# Patient Record
Sex: FEMALE | Race: WHITE | NOT HISPANIC OR LATINO | Employment: UNEMPLOYED | URBAN - METROPOLITAN AREA
[De-identification: names, ages, dates, MRNs, and addresses within clinical notes are randomized per-mention and may not be internally consistent; named-entity substitution may affect disease eponyms.]

---

## 2023-03-10 ENCOUNTER — HOSPITAL ENCOUNTER (EMERGENCY)
Facility: HOSPITAL | Age: 33
Discharge: HOME | End: 2023-03-10
Attending: FAMILY MEDICINE
Payer: MEDICAID

## 2023-03-10 VITALS
HEIGHT: 67 IN | SYSTOLIC BLOOD PRESSURE: 176 MMHG | HEART RATE: 78 BPM | TEMPERATURE: 98 F | BODY MASS INDEX: 26.99 KG/M2 | DIASTOLIC BLOOD PRESSURE: 106 MMHG | RESPIRATION RATE: 16 BRPM | OXYGEN SATURATION: 99 % | WEIGHT: 171.94 LBS

## 2023-03-10 DIAGNOSIS — N18.6 CKD (CHRONIC KIDNEY DISEASE) REQUIRING CHRONIC DIALYSIS: Primary | ICD-10-CM

## 2023-03-10 DIAGNOSIS — Z99.2 CKD (CHRONIC KIDNEY DISEASE) REQUIRING CHRONIC DIALYSIS: Primary | ICD-10-CM

## 2023-03-10 DIAGNOSIS — T82.848A PAIN FROM A/V FISTULA: ICD-10-CM

## 2023-03-10 DIAGNOSIS — I82.C29 CHRONIC DEEP VEIN THROMBOSIS (DVT) OF INTERNAL JUGULAR VEIN: ICD-10-CM

## 2023-03-10 LAB
ALBUMIN SERPL-MCNC: 3.5 G/DL (ref 3.5–5)
ALBUMIN/GLOB SERPL: 1 RATIO (ref 1.1–2)
ALP SERPL-CCNC: 98 UNIT/L (ref 40–150)
ALT SERPL-CCNC: 15 UNIT/L (ref 0–55)
AST SERPL-CCNC: 11 UNIT/L (ref 5–34)
B-HCG SERPL QL: NEGATIVE
BASOPHILS # BLD AUTO: 0.11 X10(3)/MCL (ref 0–0.2)
BASOPHILS NFR BLD AUTO: 1.1 %
BILIRUBIN DIRECT+TOT PNL SERPL-MCNC: 0.2 MG/DL
BUN SERPL-MCNC: 26.7 MG/DL (ref 7–18.7)
CALCIUM SERPL-MCNC: 9.5 MG/DL (ref 8.4–10.2)
CHLORIDE SERPL-SCNC: 107 MMOL/L (ref 98–107)
CO2 SERPL-SCNC: 24 MMOL/L (ref 22–29)
CREAT SERPL-MCNC: 2.77 MG/DL (ref 0.55–1.02)
EOSINOPHIL # BLD AUTO: 0.23 X10(3)/MCL (ref 0–0.9)
EOSINOPHIL NFR BLD AUTO: 2.4 %
ERYTHROCYTE [DISTWIDTH] IN BLOOD BY AUTOMATED COUNT: 12.9 % (ref 11.5–17)
GFR SERPLBLD CREATININE-BSD FMLA CKD-EPI: 22 MLS/MIN/1.73/M2
GLOBULIN SER-MCNC: 3.4 GM/DL (ref 2.4–3.5)
GLUCOSE SERPL-MCNC: 104 MG/DL (ref 74–100)
HCT VFR BLD AUTO: 29.5 % (ref 37–47)
HGB BLD-MCNC: 9.6 G/DL (ref 12–16)
IMM GRANULOCYTES # BLD AUTO: 0.03 X10(3)/MCL (ref 0–0.04)
IMM GRANULOCYTES NFR BLD AUTO: 0.3 %
LYMPHOCYTES # BLD AUTO: 1.28 X10(3)/MCL (ref 0.6–4.6)
LYMPHOCYTES NFR BLD AUTO: 13.1 %
MCH RBC QN AUTO: 29.1 PG
MCHC RBC AUTO-ENTMCNC: 32.5 G/DL (ref 33–36)
MCV RBC AUTO: 89.4 FL (ref 80–94)
MONOCYTES # BLD AUTO: 0.74 X10(3)/MCL (ref 0.1–1.3)
MONOCYTES NFR BLD AUTO: 7.6 %
NEUTROPHILS # BLD AUTO: 7.39 X10(3)/MCL (ref 2.1–9.2)
NEUTROPHILS NFR BLD AUTO: 75.5 %
NRBC BLD AUTO-RTO: 0 %
PLATELET # BLD AUTO: 410 X10(3)/MCL (ref 130–400)
PMV BLD AUTO: 9.3 FL (ref 7.4–10.4)
POTASSIUM SERPL-SCNC: 3.9 MMOL/L (ref 3.5–5.1)
PROT SERPL-MCNC: 6.9 GM/DL (ref 6.4–8.3)
RBC # BLD AUTO: 3.3 X10(6)/MCL (ref 4.2–5.4)
SODIUM SERPL-SCNC: 139 MMOL/L (ref 136–145)
WBC # SPEC AUTO: 9.8 X10(3)/MCL (ref 4.5–11.5)

## 2023-03-10 PROCEDURE — 99283 EMERGENCY DEPT VISIT LOW MDM: CPT | Mod: 25

## 2023-03-10 PROCEDURE — 84703 CHORIONIC GONADOTROPIN ASSAY: CPT | Performed by: FAMILY MEDICINE

## 2023-03-10 PROCEDURE — 85025 COMPLETE CBC W/AUTO DIFF WBC: CPT | Performed by: FAMILY MEDICINE

## 2023-03-10 PROCEDURE — 80053 COMPREHEN METABOLIC PANEL: CPT | Performed by: FAMILY MEDICINE

## 2023-03-10 RX ORDER — HYDROCODONE BITARTRATE AND ACETAMINOPHEN 5; 325 MG/1; MG/1
1 TABLET ORAL EVERY 6 HOURS PRN
Qty: 12 TABLET | Refills: 0 | Status: SHIPPED | OUTPATIENT
Start: 2023-03-10 | End: 2023-03-15

## 2023-03-10 NOTE — LETTER
Patient: Maya Pérez  YOB: 1990  Date: 3/10/2023 Time: 2:35 PM  Location: [facility]    Leaving the Hospital Against Medical Advice    Chart #:[id number]    This will certify that I, the undersigned,    ______________________________________________________________________    A patient in the above named medical center, having requested discharge and removal from the medical center against the advice of my attending physician(s), hereby release [facility], its physicians, officers and employees, severally and individually, from any and all liability of any nature whatsoever for any injury or harm or complication of any kind that may result directly or indirectly, by reason of my terminating my stay as a patient at [facility] and my departure from [facility], and hereby waive any and all rights of action I may now have or later acquire as a result of my voluntary departure from [facility] and the termination of my stay as a patient therein.    This release is made with the full knowledge of the danger that may result from the action which I am taking.      Date:_______________________                         ___________________________                                                                                    Patient/Legal Representative    Witness:        ____________________________                          ___________________________  Nurse                                                                        Physician

## 2023-03-15 ENCOUNTER — HOSPITAL ENCOUNTER (EMERGENCY)
Facility: HOSPITAL | Age: 33
Discharge: HOME | End: 2023-03-15
Attending: EMERGENCY MEDICINE
Payer: MEDICAID

## 2023-03-15 VITALS
BODY MASS INDEX: 26.99 KG/M2 | TEMPERATURE: 100 F | RESPIRATION RATE: 18 BRPM | HEART RATE: 97 BPM | HEIGHT: 67 IN | OXYGEN SATURATION: 100 % | DIASTOLIC BLOOD PRESSURE: 112 MMHG | SYSTOLIC BLOOD PRESSURE: 159 MMHG | WEIGHT: 171.94 LBS

## 2023-03-15 DIAGNOSIS — R03.0 ELEVATED BLOOD PRESSURE READING: ICD-10-CM

## 2023-03-15 DIAGNOSIS — N18.6 CHRONIC KIDNEY DISEASE REQUIRING CHRONIC DIALYSIS: Primary | ICD-10-CM

## 2023-03-15 DIAGNOSIS — Z99.2 CHRONIC KIDNEY DISEASE REQUIRING CHRONIC DIALYSIS: Primary | ICD-10-CM

## 2023-03-15 DIAGNOSIS — M79.601 RIGHT ARM PAIN: ICD-10-CM

## 2023-03-15 LAB
ALBUMIN SERPL-MCNC: 3.4 G/DL (ref 3.5–5)
ALBUMIN/GLOB SERPL: 0.9 RATIO (ref 1.1–2)
ALP SERPL-CCNC: 98 UNIT/L (ref 40–150)
ALT SERPL-CCNC: 14 UNIT/L (ref 0–55)
AST SERPL-CCNC: 10 UNIT/L (ref 5–34)
BASOPHILS # BLD AUTO: 0.14 X10(3)/MCL (ref 0–0.2)
BASOPHILS NFR BLD AUTO: 1.5 %
BILIRUBIN DIRECT+TOT PNL SERPL-MCNC: 0.3 MG/DL
BUN SERPL-MCNC: 24.8 MG/DL (ref 7–18.7)
CALCIUM SERPL-MCNC: 9.9 MG/DL (ref 8.4–10.2)
CHLORIDE SERPL-SCNC: 107 MMOL/L (ref 98–107)
CO2 SERPL-SCNC: 20 MMOL/L (ref 22–29)
CREAT SERPL-MCNC: 2.45 MG/DL (ref 0.55–1.02)
EOSINOPHIL # BLD AUTO: 0.19 X10(3)/MCL (ref 0–0.9)
EOSINOPHIL NFR BLD AUTO: 2.1 %
ERYTHROCYTE [DISTWIDTH] IN BLOOD BY AUTOMATED COUNT: 12.5 % (ref 11.5–17)
GFR SERPLBLD CREATININE-BSD FMLA CKD-EPI: 26 MLS/MIN/1.73/M2
GLOBULIN SER-MCNC: 3.9 GM/DL (ref 2.4–3.5)
GLUCOSE SERPL-MCNC: 97 MG/DL (ref 74–100)
HCT VFR BLD AUTO: 28.7 % (ref 37–47)
HGB BLD-MCNC: 9.5 G/DL (ref 12–16)
IMM GRANULOCYTES # BLD AUTO: 0.04 X10(3)/MCL (ref 0–0.04)
IMM GRANULOCYTES NFR BLD AUTO: 0.4 %
LYMPHOCYTES # BLD AUTO: 1.3 X10(3)/MCL (ref 0.6–4.6)
LYMPHOCYTES NFR BLD AUTO: 14.1 %
MAGNESIUM SERPL-MCNC: 1.9 MG/DL (ref 1.6–2.6)
MCH RBC QN AUTO: 29.1 PG
MCHC RBC AUTO-ENTMCNC: 33.1 G/DL (ref 33–36)
MCV RBC AUTO: 88 FL (ref 80–94)
MONOCYTES # BLD AUTO: 0.8 X10(3)/MCL (ref 0.1–1.3)
MONOCYTES NFR BLD AUTO: 8.7 %
NEUTROPHILS # BLD AUTO: 6.72 X10(3)/MCL (ref 2.1–9.2)
NEUTROPHILS NFR BLD AUTO: 73.2 %
NRBC BLD AUTO-RTO: 0 %
PHOSPHATE SERPL-MCNC: 2.9 MG/DL (ref 2.3–4.7)
PLATELET # BLD AUTO: 470 X10(3)/MCL (ref 130–400)
PMV BLD AUTO: 9.3 FL (ref 7.4–10.4)
POTASSIUM SERPL-SCNC: 4 MMOL/L (ref 3.5–5.1)
PROT SERPL-MCNC: 7.3 GM/DL (ref 6.4–8.3)
RBC # BLD AUTO: 3.26 X10(6)/MCL (ref 4.2–5.4)
SODIUM SERPL-SCNC: 137 MMOL/L (ref 136–145)
WBC # SPEC AUTO: 9.2 X10(3)/MCL (ref 4.5–11.5)

## 2023-03-15 PROCEDURE — 99284 EMERGENCY DEPT VISIT MOD MDM: CPT | Mod: 25

## 2023-03-15 PROCEDURE — 85025 COMPLETE CBC W/AUTO DIFF WBC: CPT | Performed by: NURSE PRACTITIONER

## 2023-03-15 PROCEDURE — 96374 THER/PROPH/DIAG INJ IV PUSH: CPT

## 2023-03-15 PROCEDURE — 80053 COMPREHEN METABOLIC PANEL: CPT | Performed by: NURSE PRACTITIONER

## 2023-03-15 PROCEDURE — 84100 ASSAY OF PHOSPHORUS: CPT | Performed by: NURSE PRACTITIONER

## 2023-03-15 PROCEDURE — 63600175 PHARM REV CODE 636 W HCPCS: Performed by: NURSE PRACTITIONER

## 2023-03-15 PROCEDURE — 83735 ASSAY OF MAGNESIUM: CPT | Performed by: NURSE PRACTITIONER

## 2023-03-15 RX ORDER — HEPARIN SODIUM 1000 [USP'U]/ML
4000 INJECTION, SOLUTION INTRAVENOUS; SUBCUTANEOUS
Status: COMPLETED | OUTPATIENT
Start: 2023-03-15 | End: 2023-03-15

## 2023-03-15 RX ORDER — HEPARIN 100 UNIT/ML
500 SYRINGE INTRAVENOUS
Status: DISCONTINUED | OUTPATIENT
Start: 2023-03-15 | End: 2023-03-15

## 2023-03-15 RX ORDER — GABAPENTIN 300 MG/1
300 CAPSULE ORAL
Status: DISCONTINUED | OUTPATIENT
Start: 2023-03-15 | End: 2023-03-15 | Stop reason: HOSPADM

## 2023-03-15 RX ORDER — TRAMADOL HYDROCHLORIDE 50 MG/1
50 TABLET ORAL EVERY 12 HOURS PRN
Qty: 10 TABLET | Refills: 0 | Status: SHIPPED | OUTPATIENT
Start: 2023-03-15 | End: 2023-03-20

## 2023-03-15 RX ADMIN — HEPARIN SODIUM 3600 UNITS: 1000 INJECTION, SOLUTION INTRAVENOUS; SUBCUTANEOUS at 12:03

## 2023-03-15 NOTE — DISCHARGE INSTRUCTIONS
Follow up with IM Clinic as discussed to obtain a PCP.  Take pain medication as prescribed for no more than 7 days.  Follow up with [facility] as discussed in ED.  Return to the [facility] ED immediately for onset of chest pain, SOB, or fever.

## 2023-03-15 NOTE — ED NOTES
Appointment scheduled in medicine clinic to establish primary care, appointment provided to patient prior to discharge from er

## 2023-03-15 NOTE — ED PROVIDER NOTES
Encounter Date: 3/15/2023       History     Chief Complaint   Patient presents with    Vascular Access Problem     PT  DIALYSIS PORT TO RT CHEST NOT WORKING.  PT REPORTS CONTINUED PAIN AND SWELLING.  LAST DIALYSIS X 2 WKS.  STATES NEEDS TO BE MOVED.      Pt is a 32 y.o. female who presents to the [facility] ED for evaluation of her Rt chest wall dialysis catheter. Reports trying to go to dialysis yesterday and was instructed to follow up with an ED due to poor flow from site. Pt seen at the [facility] ED on 3/10 for similar issue after expressing concerns of infection to the site. Denies chest pain, SOB, weakness, dizziness, fever, redness to port site, purulent drainage from area, or swelling to lower extremities. Pt normally obtains dialysis in [place], [place]. Reports going approx 2 weeks now since last dialysis.     Review of patient's allergies indicates:  No Known Allergies  Past Medical History:   Diagnosis Date    Hypertension     Renal disorder      History reviewed. No pertinent surgical history.  History reviewed. No pertinent family history.  Social History     Tobacco Use    Smoking status: Every Day     Types: Cigarettes    Smokeless tobacco: Never   Substance Use Topics    Drug use: Not Currently     Review of Systems   Constitutional:  Negative for chills, diaphoresis, fatigue and fever.   HENT:  Negative for facial swelling, rhinorrhea, sinus pressure, sinus pain, sore throat and trouble swallowing.    Respiratory:  Negative for cough, chest tightness, shortness of breath and wheezing.    Cardiovascular:  Negative for chest pain, palpitations and leg swelling.   Gastrointestinal:  Negative for abdominal pain, diarrhea, nausea and vomiting.   Genitourinary:  Negative for dysuria, flank pain, frequency, hematuria and urgency.   Musculoskeletal:  Negative for arthralgias, back pain, joint swelling and myalgias.   Skin:  Negative for color change and rash.   Neurological:  Negative for dizziness, syncope, weakness  and light-headedness.   Hematological:  Does not bruise/bleed easily.   All other systems reviewed and are negative.    Physical Exam     Initial Vitals [03/15/23 0945]   BP Pulse Resp Temp SpO2   (!) 168/102 (!) 112 18 99.5 °F (37.5 °C) 98 %      MAP       --         Physical Exam    Nursing note and vitals reviewed.  Constitutional: She appears well-developed and well-nourished.   HENT:   Head: Normocephalic and atraumatic.   Nose: Nose normal.   Mouth/Throat: Oropharynx is clear and moist.   Eyes: Conjunctivae and EOM are normal. Pupils are equal, round, and reactive to light.   Neck: Neck supple.   Normal range of motion.  Cardiovascular:  Normal rate, regular rhythm, normal heart sounds and intact distal pulses.           Pulmonary/Chest: Effort normal and breath sounds normal. No respiratory distress. She has no wheezes. She has no rhonchi. She has no rales. She exhibits no tenderness, no bony tenderness, no crepitus, no edema, no deformity and no swelling.   Rt chest wall dialysis catheter noted. No erythema or purulent drainage to insertion site.    Abdominal: Abdomen is soft and flat. Bowel sounds are normal. She exhibits no distension. There is no abdominal tenderness. There is no rebound, no guarding, no tenderness at McBurney's point and negative Santillan's sign. negative psoas sign  Musculoskeletal:         General: Normal range of motion.      Right shoulder: Tenderness present. No swelling. Normal range of motion. Normal strength. Normal pulse.        Arms:       Cervical back: Normal range of motion and neck supple.     Neurological: She is alert and oriented to person, place, and time. She has normal strength and normal reflexes.   Skin: Skin is warm and dry. Capillary refill takes less than 2 seconds.   Psychiatric: She has a normal mood and affect. Her speech is normal and behavior is normal. Judgment and thought content normal.       ED Course   Procedures  Labs Reviewed   COMPREHENSIVE METABOLIC  PANEL - Abnormal; Notable for the following components:       Result Value    Carbon Dioxide 20 (*)     Blood Urea Nitrogen 24.8 (*)     Creatinine 2.45 (*)     Albumin Level 3.4 (*)     Globulin 3.9 (*)     Albumin/Globulin Ratio 0.9 (*)     All other components within normal limits   CBC WITH DIFFERENTIAL - Abnormal; Notable for the following components:    RBC 3.26 (*)     Hgb 9.5 (*)     Hct 28.7 (*)     Platelet 470 (*)     All other components within normal limits   MAGNESIUM - Normal   PHOSPHORUS - Normal   CBC W/ AUTO DIFFERENTIAL    Narrative:     The following orders were created for panel order CBC auto differential.  Procedure                               Abnormality         Status                     ---------                               -----------         ------                     CBC with Differential[[id number]]        Abnormal            Final result                 Please view results for these tests on the individual orders.   EXTRA TUBES    Narrative:     The following orders were created for panel order EXTRA TUBES.  Procedure                               Abnormality         Status                     ---------                               -----------         ------                     Light Blue Top Hold[[id number]]                              In process                 Gold Top Hold[[id number]]                                    In process                 Pink Top Hold[[id number]]                                    In process                   Please view results for these tests on the individual orders.   LIGHT BLUE TOP HOLD   GOLD TOP HOLD   PINK TOP HOLD   POCT URINE PREGNANCY          Imaging Results    None          Medications   gabapentin capsule 300 mg (300 mg Oral Not Given 3/15/23 1200)   heparin (porcine) injection 4,000 Units (3,600 Units Intravenous Given 3/15/23 1209)     Medical Decision Making:   History:   Old Medical Records: I decided to obtain old medical records.  Old  Records Summarized: records from previous admission(s).       <> Summary of Records: Accession #: [id number]  Maya Pérez  CV Ultrasound doppler venous arm right  Order# [id number]  Reading physician: Nayan Alexander MD Ordering physician: Aidan Barnes MD Study date: 3/10/23    Reason for Exam  Priority: Routine  Dx: Pain from A/V fistula [T82.848A (ICD-10-CM)]    Conclusion    The right IJV is positive for age-indeterminant occlusive DVT.  There is questionable trickle flow.    There is a what appears to be a patent collateral vein adjacent to the right carotid artery visualized.  Cine provided.     The patient has a right chest port for dialysis.     Results were reported to Dr. Barnes in the ED.  Performing Clinician    Ledy Cramer RDMS Reason for Exam  Priority: Routine    Dx: Pain from A/V fistula [T82.848A (ICD-10-CM)]      03/10/23 13:21  WBC: 9.8  RBC: 3.30 (L)  Hemoglobin: 9.6 (L)  Hematocrit: 29.5 (L)  MCV: 89.4  MCH: 29.1  MCHC: 32.5 (L)  RDW: 12.9  Platelets: 410 (H)  MPV: 9.3  Neut %: 75.5  LYMPH %: 13.1  Mono %: 7.6  Eosinophil %: 2.4  Basophil %: 1.1  Immature Granulocytes: 0.3  Neut #: 7.39  Lymph #: 1.28  Mono #: 0.74  Eos #: 0.23  Baso #: 0.11  Immature Grans (Abs): 0.03  nRBC: 0.0  Sodium: 139  Potassium: 3.9  Chloride: 107  CO2: 24  BUN: 26.7 (H)  Creatinine: 2.77 (H)  eGFR: 22  Glucose: 104 (H)  Calcium: 9.5  Alkaline Phosphatase: 98  PROTEIN TOTAL: 6.9  Albumin: 3.5  Albumin/Globulin Ratio: 1.0 (L)  BILIRUBIN TOTAL: 0.2  AST: 11  ALT: 15  Globulin, Total: 3.4    03/10/23 14:03  Preg, Serum: Negative      (L): Data is abnormally low  (H): Data is abnormally high  Differential Diagnosis:   CKD  Anemia  Vascular access occlusion  Clinical Tests:   Lab Tests: Ordered and Reviewed  ED Management:  11:29 AM Reassessed patient at this time. After evaluation of pt diagnostic results with Dr. Weaver, pt will be discharged at this time with medication for pain control. Due to concerns of occlusion of  "pt's vascular access, I have attempted to draw blood from each port, both withdrew blood without difficulty. I have flushed and heparin locked both ports. Pt will be referred to the [facility] to obtain a PCP as well as the [facility] for dialysis services due to concerns that her dialysis center "is not helping her." Pt continues to deny SOB, chest pain, peripheral edema. Stressed return precautions as well as my concern that her referred pain may be neurologic in nature. Pt will be placed on medication for pain control at this time. Discussed with patient all pertinent ED information and results. Discussed diagnosis and treatment plan with patient. Follow up instructions and return to ED instruction have been given. All questions and concerns were addressed at this time. Patient voices understanding of information and instructions. Patient is comfortable with plan and discharge. Patient is stable for discharge.              ED Course as of 03/15/23 1216   Wed Mar 15, 2023   1024 I have discussed pt status with Dr. Hicks, Surgery On Call, for work up recommendation in light of known occluded Rt IJV. Physician recommends evaluating if port is flowing and obtaining lab values as planned. [JA]      ED Course User Index  [JA] GIULIANO Hernandez Jr.                 Clinical Impression:   Final diagnoses:  [N18.6, Z99.2] Chronic kidney disease requiring chronic dialysis (Primary)  [R03.0] Elevated blood pressure reading  [M79.601] Right arm pain        ED Disposition Condition    Discharge Stable          ED Prescriptions       Medication Sig Dispense Start Date End Date Auth. Provider    traMADoL (ULTRAM) 50 mg tablet Take 1 tablet (50 mg total) by mouth every 12 (twelve) hours as needed for Pain. 10 tablet 3/15/2023 3/20/2023 GIULIANO Hernandez Jr.          Follow-up Information       Follow up With Specialties Details Why Contact Info    [facility] Emergency Medicine In " 3 days As needed, If symptoms worsen [address] [id number] [phone]    [facility]  Schedule an appointment as soon as possible for a visit in 1 week Follow up with [facility] to obtain a PCP [address] [id number]             Clinton Bender Jr., [facility]  03/15/23 1159       Clinton Bender Jr., [facility]  03/15/23 1216

## 2023-03-17 DIAGNOSIS — T82.49XA FAILURE OF HEMODIALYSIS ACCESS, INITIAL ENCOUNTER: Primary | ICD-10-CM

## 2023-03-22 ENCOUNTER — PATIENT OUTREACH (OUTPATIENT)
Dept: EMERGENCY MEDICINE | Facility: HOSPITAL | Age: 33
End: 2023-03-22
Payer: MEDICAID

## 2023-03-24 NOTE — PROGRESS NOTES
Attempted 3 times to reach  patient without any luck. Left message on voicemail informing patient of missed PCP appointment 03/23/23 and provided IM Clinic phone # to reschedule appointment. Navigation case closed.

## 2023-03-28 ENCOUNTER — OFFICE VISIT (OUTPATIENT)
Dept: VASCULAR SURGERY | Facility: CLINIC | Age: 33
End: 2023-03-28
Payer: MEDICAID

## 2023-03-28 VITALS
DIASTOLIC BLOOD PRESSURE: 115 MMHG | HEART RATE: 92 BPM | HEIGHT: 67 IN | RESPIRATION RATE: 20 BRPM | SYSTOLIC BLOOD PRESSURE: 164 MMHG | BODY MASS INDEX: 27.28 KG/M2 | TEMPERATURE: 98 F | WEIGHT: 173.81 LBS | OXYGEN SATURATION: 98 %

## 2023-03-28 DIAGNOSIS — N17.9 ACUTE RENAL FAILURE, UNSPECIFIED ACUTE RENAL FAILURE TYPE: Primary | ICD-10-CM

## 2023-03-28 DIAGNOSIS — I82.C29 CHRONIC DEEP VEIN THROMBOSIS (DVT) OF INTERNAL JUGULAR VEIN: ICD-10-CM

## 2023-03-28 LAB
ANION GAP SERPL CALC-SCNC: 6 MEQ/L
BUN SERPL-MCNC: 31.7 MG/DL (ref 7–18.7)
CALCIUM SERPL-MCNC: 9.4 MG/DL (ref 8.4–10.2)
CHLORIDE SERPL-SCNC: 108 MMOL/L (ref 98–107)
CO2 SERPL-SCNC: 22 MMOL/L (ref 22–29)
CREAT SERPL-MCNC: 3.03 MG/DL (ref 0.55–1.02)
CREAT/UREA NIT SERPL: 10
GFR SERPLBLD CREATININE-BSD FMLA CKD-EPI: 20 MLS/MIN/1.73/M2
GLUCOSE SERPL-MCNC: 95 MG/DL (ref 74–100)
POTASSIUM SERPL-SCNC: 4.7 MMOL/L (ref 3.5–5.1)
SODIUM SERPL-SCNC: 136 MMOL/L (ref 136–145)

## 2023-03-28 PROCEDURE — 10120 INC&RMVL FB SUBQ TISS SMPL: CPT | Mod: PBBFAC | Performed by: STUDENT IN AN ORGANIZED HEALTH CARE EDUCATION/TRAINING PROGRAM

## 2023-03-28 PROCEDURE — 80048 BASIC METABOLIC PNL TOTAL CA: CPT

## 2023-03-28 PROCEDURE — 99214 OFFICE O/P EST MOD 30 MIN: CPT | Mod: PBBFAC

## 2023-03-28 PROCEDURE — 36415 COLL VENOUS BLD VENIPUNCTURE: CPT

## 2023-03-28 RX ORDER — LIDOCAINE HYDROCHLORIDE 10 MG/ML
10 INJECTION INFILTRATION; PERINEURAL
Status: COMPLETED | OUTPATIENT
Start: 2023-03-28 | End: 2023-03-28

## 2023-03-28 RX ORDER — HYDRALAZINE HYDROCHLORIDE 100 MG/1
100 TABLET, FILM COATED ORAL 3 TIMES DAILY
COMMUNITY

## 2023-03-28 RX ORDER — LIDOCAINE HYDROCHLORIDE AND EPINEPHRINE 10; 10 MG/ML; UG/ML
10 INJECTION, SOLUTION INFILTRATION; PERINEURAL
Status: SHIPPED | OUTPATIENT
Start: 2023-03-28

## 2023-03-28 RX ORDER — SEVELAMER CARBONATE 800 MG/1
800 TABLET, FILM COATED ORAL 3 TIMES DAILY
COMMUNITY
Start: 2023-03-23

## 2023-03-28 RX ADMIN — LIDOCAINE HYDROCHLORIDE 10 ML: 10 INJECTION INFILTRATION; PERINEURAL at 05:03

## 2023-03-28 NOTE — PROGRESS NOTES
Seen by Dr. German.  Consent signed.  Time out performed.  Assisted with procedure.  Tolerated well. Discharge instructions verbal and written given.

## 2023-03-29 NOTE — PROGRESS NOTES
[facility]  Consultation Note    SUBJECTIVE:     Reason for Consultation:   RIJ tunneled dlz catheter removal and RIJ thrombus    History of Present Illness:  32 yoF with h/o HELLP syndrome December complicated by acute renal failure requiring dialysis. She has had some improvement in kidney function, last dialysis was at the beginning of this month 3/3. Her catheter is positional, with machine alarming. She presented to the ED approx 2 weeks ago with acute episode of RUE swelling and was found to have occlusinve DVT of the RIJ.     Discussed case with Dr Jose (nephrology) who recommmended catheter removal and repeat Cr. If continues to be improved should not need replacement.    Allergies:  Review of patient's allergies indicates:  No Known Allergies    Home Medications:  Current Outpatient Medications on File Prior to Visit   Medication Sig    hydrALAZINE (APRESOLINE) 100 MG tablet Take 100 mg by mouth 3 (three) times daily.    RENVELA 800 mg Tab Take 800 mg by mouth 3 (three) times daily.     No current facility-administered medications on file prior to visit.       Past Medical History:   Diagnosis Date    Hypertension     Renal disorder      Past Surgical History:   Procedure Laterality Date    INSERTION OF DIALYSIS CATHETER Right      No family history on file.  Social History     Tobacco Use    Smoking status: Every Day     Packs/day: 0.50     Types: Cigarettes    Smokeless tobacco: Never   Substance Use Topics    Drug use: Not Currently        Review of Systems:  Constitutional: no fever or chills  ENT: no nasal congestion or sore throat  Respiratory: no cough or shortness of breath  Cardiovascular: no chest pain or palpitations  Gastrointestinal: no nausea, vomiting, abdominal pain, or change in bowel habits  Genitourinary: no hematuria or dysuria  Integument/Breast: no rash or pruritis  Hematologic/Lymphatic: no easy bruising or lymphadenopathy  Musculoskeletal: no arthralgias or  myalgias  Neurological: no seizures or tremors  Behavioral/Psych: no auditory or visual hallucinations    OBJECTIVE:     Vital Signs:  Temp: 98.1 °F (36.7 °C) (03/28/23 1617)  Pulse: 92 (03/28/23 1617)  Resp: 20 (03/28/23 1617)  BP: (!) 164/115 (denies headache or blurred vision) (03/28/23 1617)  SpO2: 98 % (03/28/23 1617)    Physical Exam:  General: well developed, well nourished, no distress  HEENT: normocephalic, atraumatic, EOMI, trachea midline  Lungs:  normal respiratory effort on room air  Cardiovascular: regular rate and rhythm  Abdomen: soft, non-tender to palpation, no distention, no rebound, no guarding  Extremities: no cyanosis, edema, or clubbing  Skin:  no rashes or lesions  Psych/Behavioral:  alert and oriented, appropriate affect  RIJ tunneled dialysis catheter in place. No drainage/erythema to the site.     Laboratory:  Labs 3/28: BUN 31 and Cr 3.0    Diagnostic Results:  CV US venous: The right IJV is positive for age-indeterminant occlusive DVT.  There is questionable trickle flow.      ASSESSMENT:     32 yoF with RIJ catheter associated thrombus and history of ARF from HELLP.     PLAN:     Tunneled catheter removed in clinic today  Cr stable, discussed with neprhology. She will continue to follow with them and refer back to us as needed for another catheter    Lidya Veronica MD   [facility] PGY5

## 2023-03-29 NOTE — PROGRESS NOTES
Procedure note    RIJ tunneled dialysis catheter removal    Risks/benefits were discussed and consents obtained. Timeout performed to ensure proper patient, procedure and site. The area was cleansed with chloraprep and anesthestized with 1% lidocaine. Scissors were used to remove the sutures and sharply dissect the tunnel around the catheter until the subcutaneous cuff was encountered. The capsule was freed up with scissors, and catheter removed. Occlusive dressing placed and pressure held at the neck for 8-10 minutes. Patient tolerated the procedure well.

## 2023-05-24 ENCOUNTER — DOCUMENTATION ONLY (OUTPATIENT)
Dept: NEPHROLOGY | Facility: CLINIC | Age: 33
End: 2023-05-24
Payer: MEDICAID

## 2023-05-24 NOTE — PROGRESS NOTES
Patient was no-show to clinic today. If patient calls back to reschedule, please schedule within 1-2 months.  Thank you